# Patient Record
Sex: FEMALE | Race: WHITE | Employment: UNEMPLOYED | URBAN - METROPOLITAN AREA
[De-identification: names, ages, dates, MRNs, and addresses within clinical notes are randomized per-mention and may not be internally consistent; named-entity substitution may affect disease eponyms.]

---

## 2017-01-04 PROCEDURE — 36415 COLL VENOUS BLD VENIPUNCTURE: CPT | Performed by: OBSTETRICS & GYNECOLOGY

## 2017-02-24 PROCEDURE — 87491 CHLMYD TRACH DNA AMP PROBE: CPT | Performed by: OBSTETRICS & GYNECOLOGY

## 2017-02-24 PROCEDURE — 87591 N.GONORRHOEAE DNA AMP PROB: CPT | Performed by: OBSTETRICS & GYNECOLOGY

## 2017-03-23 PROBLEM — O09.529 AMA (ADVANCED MATERNAL AGE) MULTIGRAVIDA 35+: Status: ACTIVE | Noted: 2017-03-23

## 2017-03-24 ENCOUNTER — OFFICE VISIT (OUTPATIENT)
Dept: PERINATAL CARE | Facility: HOSPITAL | Age: 37
End: 2017-03-24
Attending: OBSTETRICS & GYNECOLOGY
Payer: COMMERCIAL

## 2017-03-24 VITALS
HEART RATE: 95 BPM | SYSTOLIC BLOOD PRESSURE: 113 MMHG | DIASTOLIC BLOOD PRESSURE: 75 MMHG | BODY MASS INDEX: 26 KG/M2 | WEIGHT: 173 LBS

## 2017-03-24 DIAGNOSIS — O09.522 AMA (ADVANCED MATERNAL AGE) MULTIGRAVIDA 35+, SECOND TRIMESTER: Primary | ICD-10-CM

## 2017-03-24 PROCEDURE — 99242 OFF/OP CONSLTJ NEW/EST SF 20: CPT

## 2017-04-24 PROCEDURE — 82950 GLUCOSE TEST: CPT | Performed by: OBSTETRICS & GYNECOLOGY

## 2017-06-16 ENCOUNTER — OFFICE VISIT (OUTPATIENT)
Dept: PERINATAL CARE | Facility: HOSPITAL | Age: 37
End: 2017-06-16
Attending: OBSTETRICS & GYNECOLOGY
Payer: COMMERCIAL

## 2017-06-16 VITALS — DIASTOLIC BLOOD PRESSURE: 72 MMHG | SYSTOLIC BLOOD PRESSURE: 124 MMHG | HEART RATE: 107 BPM

## 2017-06-16 DIAGNOSIS — O09.523 AMA (ADVANCED MATERNAL AGE) MULTIGRAVIDA 35+, THIRD TRIMESTER: Primary | ICD-10-CM

## 2017-06-16 PROCEDURE — 99213 OFFICE O/P EST LOW 20 MIN: CPT

## 2017-06-16 PROCEDURE — 76819 FETAL BIOPHYS PROFIL W/O NST: CPT

## 2017-06-16 NOTE — PROGRESS NOTES
Nataliya Ibarra    Dear Dr. Home Rivero    Thank you for requesting ultrasound evaluation and maternal fetal medicine consultation on your patient Treva Fuentes.   As you are aware she is a 30year old female  with a Sin Visualized and normal appearance: cerebellum. Gastrointestinal Tract: stomach visible. Summary of Ultrasound Findings:  Impression:  The fetal growth is appropriate    ____________________________________________________________________________  Fetal W

## 2017-07-11 ENCOUNTER — APPOINTMENT (OUTPATIENT)
Dept: LAB | Age: 37
End: 2017-07-11
Attending: OBSTETRICS & GYNECOLOGY
Payer: COMMERCIAL

## 2017-07-11 PROCEDURE — 87081 CULTURE SCREEN ONLY: CPT

## 2017-08-03 ENCOUNTER — HOSPITAL ENCOUNTER (OUTPATIENT)
Facility: HOSPITAL | Age: 37
Setting detail: OBSERVATION
Discharge: HOME | End: 2017-08-03
Attending: OBSTETRICS & GYNECOLOGY | Admitting: OBSTETRICS & GYNECOLOGY

## 2017-08-03 VITALS
HEART RATE: 90 BPM | TEMPERATURE: 98 F | RESPIRATION RATE: 18 BRPM | SYSTOLIC BLOOD PRESSURE: 134 MMHG | DIASTOLIC BLOOD PRESSURE: 89 MMHG

## 2017-08-03 PROCEDURE — 59025 FETAL NON-STRESS TEST: CPT

## 2017-08-03 NOTE — NST
Nonstress Test   Patient: Donna Munguia    Gestation: 39w2d    NST:       Variability: Moderate           Accelerations: Yes           Decelerations: None            Baseline: 115 BPM           Uterine Irritability: No           Contractions: Irregul

## 2017-08-03 NOTE — PROGRESS NOTES
Pt is a 30year old female admitted to 115/115-A. Patient presents with:  R/o Labor: sent from office after having a variable deceleration on the EFM and patient has been ben today     Pt is  39w2d intra-uterine pregnancy.   History obtaine

## 2017-08-04 ENCOUNTER — HOSPITAL ENCOUNTER (INPATIENT)
Facility: HOSPITAL | Age: 37
LOS: 2 days | Discharge: HOME | End: 2017-08-06
Attending: OBSTETRICS & GYNECOLOGY | Admitting: OBSTETRICS & GYNECOLOGY
Payer: COMMERCIAL

## 2017-08-04 ENCOUNTER — APPOINTMENT (OUTPATIENT)
Dept: OBGYN CLINIC | Facility: HOSPITAL | Age: 37
End: 2017-08-04
Payer: COMMERCIAL

## 2017-08-04 LAB
ANTIBODY SCREEN: NEGATIVE
BILIRUBIN URINE: NEGATIVE
CONTROL RUN WITHIN 24 HOURS?: YES
ERYTHROCYTE [DISTWIDTH] IN BLOOD BY AUTOMATED COUNT: 13.9 % (ref 11.5–16)
GLUCOSE URINE: NEGATIVE
HCT VFR BLD AUTO: 33.5 % (ref 34–50)
HGB BLD-MCNC: 11 G/DL (ref 12–16)
KETONE URINE: NEGATIVE
MCH RBC QN AUTO: 29 PG (ref 27–33.2)
MCHC RBC AUTO-ENTMCNC: 32.8 G/DL (ref 31–37)
MCV RBC AUTO: 88.4 FL (ref 81–100)
NITRITE URINE: NEGATIVE
PH URINE: 5.5 (ref 5–8)
PLATELET # BLD AUTO: 209 10(3)UL (ref 150–450)
PROTEIN URINE: 30
RBC # BLD AUTO: 3.79 X10(6)UL (ref 3.8–5.1)
RED CELL DISTRIBUTION WIDTH-SD: 44.5 FL (ref 35.1–46.3)
RH BLOOD TYPE: POSITIVE
SPEC GRAVITY: 1.02 (ref 1–1.03)
T PALLIDUM AB SER QL IA: NONREACTIVE
URINE CLARITY: CLEAR
URINE COLOR: YELLOW
UROBILINOGEN URINE: 0.2
WBC # BLD AUTO: 9.8 X10(3) UL (ref 4–13)

## 2017-08-04 PROCEDURE — 86780 TREPONEMA PALLIDUM: CPT | Performed by: OBSTETRICS & GYNECOLOGY

## 2017-08-04 PROCEDURE — 85027 COMPLETE CBC AUTOMATED: CPT | Performed by: OBSTETRICS & GYNECOLOGY

## 2017-08-04 PROCEDURE — 81002 URINALYSIS NONAUTO W/O SCOPE: CPT

## 2017-08-04 PROCEDURE — 86850 RBC ANTIBODY SCREEN: CPT | Performed by: OBSTETRICS & GYNECOLOGY

## 2017-08-04 PROCEDURE — 10907ZC DRAINAGE OF AMNIOTIC FLUID, THERAPEUTIC FROM PRODUCTS OF CONCEPTION, VIA NATURAL OR ARTIFICIAL OPENING: ICD-10-PCS | Performed by: OBSTETRICS & GYNECOLOGY

## 2017-08-04 PROCEDURE — 3E033VJ INTRODUCTION OF OTHER HORMONE INTO PERIPHERAL VEIN, PERCUTANEOUS APPROACH: ICD-10-PCS | Performed by: OBSTETRICS & GYNECOLOGY

## 2017-08-04 PROCEDURE — 86900 BLOOD TYPING SEROLOGIC ABO: CPT | Performed by: OBSTETRICS & GYNECOLOGY

## 2017-08-04 PROCEDURE — 86901 BLOOD TYPING SEROLOGIC RH(D): CPT | Performed by: OBSTETRICS & GYNECOLOGY

## 2017-08-04 PROCEDURE — 0HQ9XZZ REPAIR PERINEUM SKIN, EXTERNAL APPROACH: ICD-10-PCS | Performed by: OBSTETRICS & GYNECOLOGY

## 2017-08-04 RX ORDER — ACETAMINOPHEN 325 MG/1
650 TABLET ORAL EVERY 4 HOURS PRN
Status: DISCONTINUED | OUTPATIENT
Start: 2017-08-04 | End: 2017-08-06

## 2017-08-04 RX ORDER — NALBUPHINE HCL 10 MG/ML
2.5 AMPUL (ML) INJECTION
Status: DISCONTINUED | OUTPATIENT
Start: 2017-08-04 | End: 2017-08-06

## 2017-08-04 RX ORDER — EPHEDRINE SULFATE 50 MG/ML
5 INJECTION, SOLUTION INTRAVENOUS AS NEEDED
Status: DISCONTINUED | OUTPATIENT
Start: 2017-08-04 | End: 2017-08-04

## 2017-08-04 RX ORDER — SODIUM CHLORIDE, SODIUM LACTATE, POTASSIUM CHLORIDE, CALCIUM CHLORIDE 600; 310; 30; 20 MG/100ML; MG/100ML; MG/100ML; MG/100ML
INJECTION, SOLUTION INTRAVENOUS CONTINUOUS
Status: DISCONTINUED | OUTPATIENT
Start: 2017-08-04 | End: 2017-08-04

## 2017-08-04 RX ORDER — ZOLPIDEM TARTRATE 5 MG/1
5 TABLET ORAL NIGHTLY PRN
Status: DISCONTINUED | OUTPATIENT
Start: 2017-08-04 | End: 2017-08-06

## 2017-08-04 RX ORDER — BISACODYL 10 MG
10 SUPPOSITORY, RECTAL RECTAL ONCE AS NEEDED
Status: ACTIVE | OUTPATIENT
Start: 2017-08-04 | End: 2017-08-04

## 2017-08-04 RX ORDER — DEXTROSE, SODIUM CHLORIDE, SODIUM LACTATE, POTASSIUM CHLORIDE, AND CALCIUM CHLORIDE 5; .6; .31; .03; .02 G/100ML; G/100ML; G/100ML; G/100ML; G/100ML
INJECTION, SOLUTION INTRAVENOUS AS NEEDED
Status: DISCONTINUED | OUTPATIENT
Start: 2017-08-04 | End: 2017-08-04

## 2017-08-04 RX ORDER — IBUPROFEN 600 MG/1
600 TABLET ORAL ONCE AS NEEDED
Status: DISCONTINUED | OUTPATIENT
Start: 2017-08-04 | End: 2017-08-04

## 2017-08-04 RX ORDER — IBUPROFEN 600 MG/1
600 TABLET ORAL EVERY 6 HOURS
Status: DISCONTINUED | OUTPATIENT
Start: 2017-08-05 | End: 2017-08-06

## 2017-08-04 RX ORDER — TERBUTALINE SULFATE 1 MG/ML
0.25 INJECTION, SOLUTION SUBCUTANEOUS AS NEEDED
Status: DISCONTINUED | OUTPATIENT
Start: 2017-08-04 | End: 2017-08-04

## 2017-08-04 RX ORDER — HYDROCODONE BITARTRATE AND ACETAMINOPHEN 5; 325 MG/1; MG/1
1 TABLET ORAL EVERY 4 HOURS PRN
Status: DISCONTINUED | OUTPATIENT
Start: 2017-08-04 | End: 2017-08-06

## 2017-08-04 RX ORDER — SIMETHICONE 80 MG
80 TABLET,CHEWABLE ORAL 3 TIMES DAILY PRN
Status: DISCONTINUED | OUTPATIENT
Start: 2017-08-04 | End: 2017-08-06

## 2017-08-04 RX ORDER — HYDROCODONE BITARTRATE AND ACETAMINOPHEN 5; 325 MG/1; MG/1
2 TABLET ORAL EVERY 4 HOURS PRN
Status: DISCONTINUED | OUTPATIENT
Start: 2017-08-04 | End: 2017-08-06

## 2017-08-04 RX ORDER — DOCUSATE SODIUM 100 MG/1
100 CAPSULE, LIQUID FILLED ORAL
Status: DISCONTINUED | OUTPATIENT
Start: 2017-08-05 | End: 2017-08-06

## 2017-08-04 NOTE — PROGRESS NOTES
Pt is a 30year old female admitted to 115/115-A. Patient presents with:  Scheduled Induction     Pt is  39w3d intra-uterine pregnancy. History obtained, consents signed. Oriented to room, staff, and plan of care.  Denies bleeding or leaking of fl

## 2017-08-04 NOTE — H&P
[facility]    History & Physical    Sarthak Boyce Patient Status:  Inpatient    3/30/1980 MRN [id number]   Location [address] [facility] Attending Araceli Arriola MD   Hosp Day # 0 PCP Carisa Whalen MD     Date of Admission: Admission:  Loratadine (CLARITIN OR)  Disp:  Rfl:  Past Month at Unknown time   Prenatal Multivit-Min-Fe-FA (PRENATAL OR) Take by mouth.  Disp:  Rfl:  8/3/2017 at Unknown time     Allergies: No Known Allergies    OBJECTIVE:    Temp:  [98.1 °F (36.7 °C)-98.4

## 2017-08-05 PROBLEM — O09.529 AMA (ADVANCED MATERNAL AGE) MULTIGRAVIDA 35+: Status: RESOLVED | Noted: 2017-03-23 | Resolved: 2017-08-05

## 2017-08-05 LAB
BASOPHILS # BLD AUTO: 0.06 X10(3) UL (ref 0–0.1)
BASOPHILS NFR BLD AUTO: 0.5 %
EOSINOPHIL # BLD AUTO: 0.09 X10(3) UL (ref 0–0.3)
EOSINOPHIL NFR BLD AUTO: 0.8 %
ERYTHROCYTE [DISTWIDTH] IN BLOOD BY AUTOMATED COUNT: 13.6 % (ref 11.5–16)
HCT VFR BLD AUTO: 33.3 % (ref 34–50)
HGB BLD-MCNC: 10.9 G/DL (ref 12–16)
IMMATURE GRANULOCYTE COUNT: 0.16 X10(3) UL (ref 0–1)
IMMATURE GRANULOCYTE RATIO %: 1.4 %
LYMPHOCYTES # BLD AUTO: 1.17 X10(3) UL (ref 0.9–4)
LYMPHOCYTES NFR BLD AUTO: 10.1 %
MCH RBC QN AUTO: 29.2 PG (ref 27–33.2)
MCHC RBC AUTO-ENTMCNC: 32.7 G/DL (ref 31–37)
MCV RBC AUTO: 89.3 FL (ref 81–100)
MONOCYTES # BLD AUTO: 1.07 X10(3) UL (ref 0.1–0.6)
MONOCYTES NFR BLD AUTO: 9.3 %
NEUTROPHIL ABS PRELIM: 9.01 X10 (3) UL (ref 1.3–6.7)
NEUTROPHILS # BLD AUTO: 9.01 X10(3) UL (ref 1.3–6.7)
NEUTROPHILS NFR BLD AUTO: 77.9 %
PLATELET # BLD AUTO: 187 10(3)UL (ref 150–450)
RBC # BLD AUTO: 3.73 X10(6)UL (ref 3.8–5.1)
RED CELL DISTRIBUTION WIDTH-SD: 44.3 FL (ref 35.1–46.3)
WBC # BLD AUTO: 11.6 X10(3) UL (ref 4–13)

## 2017-08-05 PROCEDURE — 85025 COMPLETE CBC W/AUTO DIFF WBC: CPT | Performed by: OBSTETRICS & GYNECOLOGY

## 2017-08-05 RX ORDER — HYDROCODONE BITARTRATE AND ACETAMINOPHEN 5; 325 MG/1; MG/1
1-2 TABLET ORAL EVERY 4 HOURS PRN
Qty: 20 TABLET | Refills: 0 | Status: SHIPPED | OUTPATIENT
Start: 2017-08-05 | End: 2017-09-15

## 2017-08-05 RX ORDER — IBUPROFEN 600 MG/1
600 TABLET ORAL EVERY 6 HOURS PRN
Qty: 60 TABLET | Refills: 0 | Status: SHIPPED | OUTPATIENT
Start: 2017-08-05 | End: 2017-09-15

## 2017-08-05 NOTE — L&D DELIVERY NOTE
She was found to be complete/+2. She pushed with good effort for 10 minutes under epidural anesthesia. She then delivered a viable baby girl via . The baby was vigorous on delivery and was bulb suctioned. She was placed on the mother's lap for warming. Cord    Complications:  None   Timed cord clamping:  Yes   Time in sec:  30   Cord blood disposition:  to lab   Gases sent?:  No             Resuscitation    Method:  None           Measurements    Weight:  7 lb 2.3 oz Length:  47 cm   Head circum.:

## 2017-08-05 NOTE — PROGRESS NOTES
Pt stable and pain 0/10. Pt moved in wheelchair from rm 115 to rm 1119.  and belongings moved with pt. Baby moved in basinet to rm [phone]. Report given to Nayeli PATEL to assume pt care. ID bands verified. Hugs and kisses remain in place.

## 2017-08-05 NOTE — PROGRESS NOTES
NURSING ADMISSION NOTE      Patient admitted via Wheelchair  Oriented to room. Safety precautions initiated. Bed in low position. Call light in reach.   Identification of baby verified with nurse and parents  Hugs/kisses in place  Discharge folder at

## 2017-08-06 VITALS
RESPIRATION RATE: 18 BRPM | HEIGHT: 68 IN | OXYGEN SATURATION: 99 % | SYSTOLIC BLOOD PRESSURE: 116 MMHG | DIASTOLIC BLOOD PRESSURE: 78 MMHG | HEART RATE: 72 BPM | TEMPERATURE: 98 F | WEIGHT: 191 LBS | BODY MASS INDEX: 28.95 KG/M2

## 2017-08-06 NOTE — PROGRESS NOTES
OB Progress Note PPD#2  S: Feels well. Ambulating, eating. Pain controlled. Minimal VB. Breastfeeding. O:   Blood pressure 120/80, pulse 79, temperature 98.1 °F (36.7 °C), temperature source Oral, resp.  rate 16, height 5' 8\" (1.727 m), weight 191 lb (

## 2017-08-09 ENCOUNTER — TELEPHONE (OUTPATIENT)
Dept: OBGYN UNIT | Facility: HOSPITAL | Age: 37
End: 2017-08-09

## 2018-02-15 PROBLEM — F41.8 DEPRESSION WITH ANXIETY: Status: ACTIVE | Noted: 2018-02-15

## 2018-10-18 PROCEDURE — 87624 HPV HI-RISK TYP POOLED RSLT: CPT | Performed by: OBSTETRICS & GYNECOLOGY

## 2018-10-18 PROCEDURE — 88175 CYTOPATH C/V AUTO FLUID REDO: CPT | Performed by: OBSTETRICS & GYNECOLOGY

## 2018-11-26 PROBLEM — M53.3 COCCYODYNIA: Status: ACTIVE | Noted: 2018-11-26

## 2020-06-18 ENCOUNTER — MED REC SCAN ONLY (OUTPATIENT)
Dept: OBGYN CLINIC | Facility: CLINIC | Age: 40
End: 2020-06-18

## 2021-03-13 NOTE — PROGRESS NOTES
[facility] Consultation    Dear Dr. Yolis Panda    Thank you for requesting ultrasound evaluation and maternal fetal medicine consultation on your patient Dian Palacio.   As you are aware she is a 31year old female  with a EXAMINATION:  /75 mmHg  Pulse 95  Wt 173 lb (78.472 kg)  LMP 11/01/2016 (Exact Date)  General: alert and oriented,no acute distress  Abdomen: gravid, soft, non-tender  Extremities: non-tender, no edema    OBSTETRIC ULTRASOUND  The patient had a level Visualized and normal appearance: four-chamber view, left outflow tract, right outflow tract, ventricles, great vessels. Aortic arch: Normal.    Summary of Ultrasound Findings:  Impression:  The fetal anatomy survey appears normal and fetal growth is appr these tests as well as the procedure associated loss rate (1:500 for genetic amniocentesis). She ultimately does not desire invasive genetic testing. Non-invasive Pregnancy Testing (NIPT)    I reviewed current non-invasive screening options.   [employer] show

## (undated) NOTE — Clinical Note
IMPRESSION: IUP at 20w3d AMA: declined screening and invasive testing for fetal aneuploidy  RECOMMENDATIONS: Continue care with Dr. Eloina Haines Level II at 20 weeks. Follow-up Growth ultrasound at 32 weeks. Weekly NST's at 36 weeks.

## (undated) NOTE — MR AVS SNAPSHOT
After Visit Summary   2017    Alana Wright    MRN: [id number]           Visit Information        Provider Department Dept Phone    2017  9:00 AM  [email or web address]   Outpt [phone]      Your Vitals Were     BP Pulse LMP [address], [address], [place]: [phone]    Important note regarding exams that require a referral/authorization: As a service to you, the [facility] will obtain any necessary prior authorization required by your benefit plan HOW TO GET STARTED: HOW TO STAY MOTIVATED:   Start activities slowly and build up over time Do what you like   Get your heart pumping – brisk walking, biking, swimming Even 10 minute increments are effective and add up over the week   2 ½ hours per week –

## (undated) NOTE — MR AVS SNAPSHOT
After Visit Summary   3/24/2017    Vinay Backleonidas    MRN: [id number]           Visit Information        Provider Department Dept Phone    3/24/2017 11:00 AM [email or web address]   Outpt [phone]      Your Vitals Were     BP Pulse Wt LMP [address] [facility] [address], [address], [place]: [phone]  [address] [facility] [address]: [phone]  [address] [place] [address]: [phone]    Important note regarding exams that require a referral/authoriza

## (undated) NOTE — Clinical Note
IMPRESSION: IUP at 32w3d AMA: low-risk cell free fetal DNA, declined invasive testing  RECOMMENDATIONS: Continue care with Dr. Michelle Patel Weekly NST's at 42 weeks.